# Patient Record
Sex: FEMALE | Race: BLACK OR AFRICAN AMERICAN | NOT HISPANIC OR LATINO
[De-identification: names, ages, dates, MRNs, and addresses within clinical notes are randomized per-mention and may not be internally consistent; named-entity substitution may affect disease eponyms.]

---

## 2021-06-12 ENCOUNTER — TRANSCRIPTION ENCOUNTER (OUTPATIENT)
Age: 76
End: 2021-06-12

## 2021-09-02 ENCOUNTER — OUTPATIENT (OUTPATIENT)
Dept: OUTPATIENT SERVICES | Facility: HOSPITAL | Age: 76
LOS: 1 days | Discharge: STILL ADMITTED OR ADMINISTRATIVE | End: 2021-09-02
Payer: MEDICARE

## 2021-09-02 PROCEDURE — 99215 OFFICE O/P EST HI 40 MIN: CPT | Mod: 95

## 2021-09-03 DIAGNOSIS — F03.91 UNSPECIFIED DEMENTIA WITH BEHAVIORAL DISTURBANCE: ICD-10-CM

## 2021-09-03 DIAGNOSIS — R41.9 UNSPECIFIED SYMPTOMS AND SIGNS INVOLVING COGNITIVE FUNCTIONS AND AWARENESS: ICD-10-CM

## 2021-10-12 ENCOUNTER — OUTPATIENT (OUTPATIENT)
Dept: OUTPATIENT SERVICES | Facility: HOSPITAL | Age: 76
LOS: 1 days | Discharge: HOME | End: 2021-10-12
Payer: MEDICARE

## 2021-10-12 PROCEDURE — 90792 PSYCH DIAG EVAL W/MED SRVCS: CPT | Mod: 95

## 2021-10-13 DIAGNOSIS — E78.5 HYPERLIPIDEMIA, UNSPECIFIED: ICD-10-CM

## 2021-10-13 DIAGNOSIS — F01.51 VASCULAR DEMENTIA, UNSPECIFIED SEVERITY, WITH BEHAVIORAL DISTURBANCE: ICD-10-CM

## 2021-10-13 DIAGNOSIS — I10 ESSENTIAL (PRIMARY) HYPERTENSION: ICD-10-CM

## 2021-10-13 DIAGNOSIS — E11.9 TYPE 2 DIABETES MELLITUS WITHOUT COMPLICATIONS: ICD-10-CM

## 2021-10-13 DIAGNOSIS — F06.4 ANXIETY DISORDER DUE TO KNOWN PHYSIOLOGICAL CONDITION: ICD-10-CM

## 2021-10-28 PROCEDURE — 99214 OFFICE O/P EST MOD 30 MIN: CPT | Mod: 95

## 2022-01-11 PROCEDURE — 99214 OFFICE O/P EST MOD 30 MIN: CPT | Mod: 95

## 2022-02-17 PROCEDURE — 99214 OFFICE O/P EST MOD 30 MIN: CPT | Mod: 95

## 2022-03-17 PROCEDURE — 99214 OFFICE O/P EST MOD 30 MIN: CPT | Mod: 95

## 2022-04-14 PROCEDURE — 99214 OFFICE O/P EST MOD 30 MIN: CPT | Mod: 95

## 2022-05-26 PROCEDURE — 99214 OFFICE O/P EST MOD 30 MIN: CPT | Mod: 95

## 2022-06-23 PROCEDURE — 99214 OFFICE O/P EST MOD 30 MIN: CPT | Mod: 95

## 2022-08-04 PROCEDURE — 99214 OFFICE O/P EST MOD 30 MIN: CPT | Mod: 95

## 2022-11-10 PROCEDURE — 99214 OFFICE O/P EST MOD 30 MIN: CPT | Mod: 95

## 2022-12-14 ENCOUNTER — APPOINTMENT (OUTPATIENT)
Dept: NEUROLOGY | Facility: CLINIC | Age: 77
End: 2022-12-14

## 2022-12-14 VITALS
SYSTOLIC BLOOD PRESSURE: 174 MMHG | TEMPERATURE: 98.4 F | BODY MASS INDEX: 20 KG/M2 | WEIGHT: 132 LBS | DIASTOLIC BLOOD PRESSURE: 78 MMHG | HEIGHT: 68 IN | HEART RATE: 62 BPM | OXYGEN SATURATION: 97 %

## 2022-12-14 PROCEDURE — 99205 OFFICE O/P NEW HI 60 MIN: CPT

## 2022-12-14 RX ORDER — DENOSUMAB 60 MG/ML
60 INJECTION SUBCUTANEOUS
Refills: 0 | Status: ACTIVE | COMMUNITY

## 2022-12-14 NOTE — PHYSICAL EXAM
[FreeTextEntry1] : PHYSICAL EXAM\par Constitutional: Alert, no acute distress \par Neck: Full range of motion\par Psychiatric: appropriate affect and mood\par Pulmonary: No respiratory distress, stable on room air\par \par NEUROLOGICAL EXAM\par Mental status: (MOCA detailed below). Oriented name and . Knows she lives in [facility], could not give me home address. Oriented to country ("[place] could not tell me name of state. Slow to respond and pauses multiple times. \par Speech/language: No dysarthria. Could name "pen" and "phone", calls watch a "clock on hand". Some comprehension difficulties and trouble following some simple tasks. \par Cranial nerves:\par CN II: Visual fields are full to confrontation. Pupil size equal and briskly reactive to light. \par CN III, IV, VI: EOMI, no nystagmus, no ptosis. Saccades and pursuits intact\par CN V: Facial sensation is intact to pinprick in all 3 divisions bilaterally.\par CN VII: Face is symmetric with normal eye closure and smile.\par CN VII: Hearing is normal to rubbing fingers\par CN IX, X: Palate elevates symmetrically. Phonation is normal.\par CN XI: Head turning and shoulder shrug are intact\par CN XII: Tongue is midline with normal movements and no atrophy.\par Motor: There is no pronator drift of out-stretched arms. Muscle bulk and tone are normal. b/l cogwheel rigidity at wrist with distraction. Strength is full bilaterally. 5/5 muscle power at bilat: Deltoid, Biceps, Triceps, Wrist ext, Finger abd, Hip flex, Hip ext, Knee flex, Knee ext, Ankle flex, Ankle ext\par Reflexes: Reflexes are 2+ and symmetric at the biceps and knees. 1+ ankle reflex. Plantar responses are flexor.\par Sensory: Intact sensations to light touch in upper and lower extremities\par Coordination: No tremors. No bradykinesia.\par Gait/Stance: Slightly stooped posture. Slow gait with short steps. Imbalance when making turns. Normal arm swings, holds arms out in an awkward position/stiff.\par \par Some ideomotor apraxia\par \par MOCA :\par Total      3/30\par Visuospatial/executive-  \par Naming- 1/3 \par Memory/delayed recall- immediate recall 3/5, delayed recall 0 (MIS: 0/15)\par Attention- \par Abstraction- 0\par Language- 0/3 \par Orientation-  (+ doctors office) (could say days of the week, but not the months)\par

## 2022-12-14 NOTE — HISTORY OF PRESENT ILLNESS
[FreeTextEntry1] : HPI (initial visit Dec 14, 2022)- WILSON SWANSON is a 77 year old right handed woman w/ hx of HTN, HLD, DM, Anxiety/depression, osteoporosis referred to neurology for memory loss. \par \par She is accompanied by daughter, Ciarra (). \par \par Memory loss- Cognitive decline most noticeable around the time of the pandemic (2020). Daughter was spending more time with her at home and noticing memory loss. Things were misplaced in the house, there would be toilet paper in the room. + Word finding difficulty, would get frustrated. SHe was more emotional labile, would cry. Got lost while driving a few times. She retired from her job as a . Daughter found multiple receipts and mail from years ago and money laying around in different envelopes. She walked out of the house a few times and would get lost and could not find her way back home. She followed up with PCP who recommended seeing neurologist (Dr. Ulisses Webb). [facility] brain [facility], carotid Doppler and EEG. Told she had "normal aging". No seizures on EEG. Daughter found a neuropsychologist and had a formal cognitive evaluation and was eventually diagnosed with vascular dementia. \par \par SHe is no longer driving. She lives with daughter. She goes to dementia center 3x/week in [place]. She rarely cooks. She talks to mirrors. Possibly has some visual hallucinations. SHe wanders in the middle of the night. No hx of strokes. No hx of seizures. No tremors. some gait imbalance. Daughter manages her medications. She feeds herself. Daughter helps her with dressing. \par \par She sees a kevin psychiatrist with [facility]. \par \par She was born and raised in [place]. \par Father probably had dementia. Her older brother likely has dementia as well.

## 2022-12-14 NOTE — DATA REVIEWED
[de-identified] : [facility] mild chronic small vessel ischemic changes.  diffuse cerebral atrophy. Ventriculomegaly noted, likely proportionate to degree of atrophy.\par [facility] Slight increase in periventricular white matter signal abnormality (microvascular ischemic vs transependymal flow) with possibly some slight increase in size of ventricles with more thinning of corpus callosum noted on this scan. Read as "moderate" microvascular ischemic changes with progression from last MRI. \par \par \par  [de-identified] : routine EEG 1/2020 normal

## 2022-12-14 NOTE — ASSESSMENT
[FreeTextEntry1] : Assessment/Plan:\par  77 year old female w/ hx of progressive cognitive and behavioral symptoms since approx 2020, affecting memory, executive functioning and language. She has had more emotional lability, hoarding behaviors and possibly some visual hallucinations as well. Furthermore, she has also had a decline in gait with more imbalance. On cognitive screening evaluation (MOCA) she performed very poorly and showed severe global cognitive impairment (3/30, mis 0/15). Neurological exam non focal and without clear parkinsonism. \par \par Moderate-severe dementia with behavioral disturbances- I suspect she may have likely had some mild cognitive symptoms even prior to 2020 which may have gone unnoticed with daughter living a very busy lifestyle. Given global cognitive impairment, it is hard to differentiate between different forms of dementia and would be important to differentiate between AD and FTD, in order to guide in treatment planning and counselling. Furthermore, there is at least mild-mod vascular changes on her brain MRI, which could explain a vascular dementia, however I would not expect her the degree of vascular changes on her MRI to explain her severe global cognitive impairment. Additionally, given the presence of balance problems with some ventriculomegaly on MRI, cannot completely rule out NPH. \par \par Plan:-\par [] Will order FDG PET scan to differentiate between possible AD vs FTD\par [] Daughter will email us all prior medical records, including neuropsych testing from 2021\par [] Discussed compensatory cognitive strategies\par [] Encouraged physical exercise, healthy eating, cognitive stimulation (readings books, word puzzles), social interaction and adequate hydration and sleep. \par [] Will refer to [facility]s Alzheimer and Dementia [facility] (ADC) program \par [] Counselled on treatment of vascular risk factors, sleep and mood disorders\par \par Return to clinic 3 months, or sooner if needed.\par If PET scan negative fir neurodegenerative disorder, would refer to memory disorder specialist for second opinion. \par \par Counselled on the diagnosis of Dementia, and reviewed its natural history. As the disease progresses, there is increasing impairment in cognitive functioning, including memory, language, and executive functioning. Behavioral changes also progress as the disease advances, as does one’s ability to function independently; eventually requiring full time assistance with daily needs. Counselled on safety and future planning. There is no curative treatment, however there are therapies available that can help temporarily mitigate cognitive symptoms of AD, but do not halt the overall progression of disease. Discussed strategies for maintaining cognitive health (encouraged healthy eating habits, routine physical exercise, social interaction and cognitive stimulations (reading, word puzzles)). Counselled on management of vascular risk factors. \par \par \inocencio Merrill M.D\par

## 2022-12-22 PROCEDURE — 99214 OFFICE O/P EST MOD 30 MIN: CPT | Mod: 95

## 2023-01-08 ENCOUNTER — APPOINTMENT (OUTPATIENT)
Dept: NUCLEAR MEDICINE | Facility: IMAGING CENTER | Age: 78
End: 2023-01-08
Payer: MEDICARE

## 2023-01-08 ENCOUNTER — OUTPATIENT (OUTPATIENT)
Dept: OUTPATIENT SERVICES | Facility: HOSPITAL | Age: 78
LOS: 1 days | End: 2023-01-08
Payer: MEDICARE

## 2023-01-08 DIAGNOSIS — F03.90 UNSPECIFIED DEMENTIA, UNSPECIFIED SEVERITY, WITHOUT BEHAVIORAL DISTURBANCE, PSYCHOTIC DISTURBANCE, MOOD DISTURBANCE, AND ANXIETY: ICD-10-CM

## 2023-01-08 PROCEDURE — 78608 BRAIN IMAGING (PET): CPT | Mod: 26

## 2023-01-08 PROCEDURE — A9552: CPT

## 2023-01-08 PROCEDURE — 78608 BRAIN IMAGING (PET): CPT

## 2023-01-10 ENCOUNTER — APPOINTMENT (OUTPATIENT)
Dept: GERIATRICS | Facility: CLINIC | Age: 78
End: 2023-01-10

## 2023-02-14 PROCEDURE — 99214 OFFICE O/P EST MOD 30 MIN: CPT | Mod: 95

## 2023-03-07 ENCOUNTER — APPOINTMENT (OUTPATIENT)
Dept: GERIATRICS | Facility: CLINIC | Age: 78
End: 2023-03-07
Payer: MEDICARE

## 2023-03-07 VITALS
HEIGHT: 68 IN | TEMPERATURE: 97.5 F | SYSTOLIC BLOOD PRESSURE: 175 MMHG | RESPIRATION RATE: 16 BRPM | OXYGEN SATURATION: 97 % | WEIGHT: 138.25 LBS | HEART RATE: 59 BPM | DIASTOLIC BLOOD PRESSURE: 82 MMHG | BODY MASS INDEX: 20.95 KG/M2

## 2023-03-07 DIAGNOSIS — Z91.83 UNSPECIFIED DEMENTIA WITH BEHAVIORAL DISTURBANCE: ICD-10-CM

## 2023-03-07 DIAGNOSIS — Z87.898 PERSONAL HISTORY OF OTHER SPECIFIED CONDITIONS: ICD-10-CM

## 2023-03-07 DIAGNOSIS — Z74.1 NEED FOR ASSISTANCE WITH PERSONAL CARE: ICD-10-CM

## 2023-03-07 DIAGNOSIS — F03.91 UNSPECIFIED DEMENTIA WITH BEHAVIORAL DISTURBANCE: ICD-10-CM

## 2023-03-07 DIAGNOSIS — Z91.81 HISTORY OF FALLING: ICD-10-CM

## 2023-03-07 PROCEDURE — 99205 OFFICE O/P NEW HI 60 MIN: CPT | Mod: 25

## 2023-03-07 RX ORDER — METFORMIN HYDROCHLORIDE 500 MG/1
500 TABLET, COATED ORAL DAILY
Refills: 0 | Status: ACTIVE | COMMUNITY

## 2023-03-07 RX ORDER — SERTRALINE HYDROCHLORIDE 50 MG/1
50 TABLET, FILM COATED ORAL DAILY
Refills: 0 | Status: ACTIVE | COMMUNITY

## 2023-03-07 NOTE — SOCIAL HISTORY
[With family] : lives with family [FreeTextEntry1] : Dyana bacon [FreeTextEntry3] : Born and raised in [place].  Goes to [facility] day program 3x/wk

## 2023-03-07 NOTE — PHYSICAL EXAM
[Sclera] : the sclera and conjunctiva were normal [Normal Outer Ear/Nose] : the ears and nose were normal in appearance [Normal] : no spinal tenderness [Normal Insight/Judgment] : insight and judgment were not intact [de-identified] : confused

## 2023-03-07 NOTE — ASSESSMENT
[FreeTextEntry1] : Main concerns are wandering at night and UI\par \par Available medical records reviewed \par Dtr to request med records from [facility]/cards - last 2 visit notes, labs, recent EKG, immunizations\par \par - Medications reviewed with CG and reconciled\par - Adv regularly stimulating activity - including cognitive and physical, and regular socialization\par - Adv 24/7 supervision for safety and assistance w/ ADLs\par - ADC program discussed and reading material about program provided. f/u with [facility] as soon as able advised \par - Referred to  for additional counseling, education, support, resources\par \par Timed voiding\par \par f/u for further cog/mood/behavior eval ASAP\par \par BP generally controlled per dtr - high today possibly d/t anxiety, monitor for now\par \par f/u with psych Dr. Marlow\par \par Rest as per PMD

## 2023-03-07 NOTE — HISTORY OF PRESENT ILLNESS
[] : Assistance needed with traveling/transport [Full assistance needed] : Assistance needed managing medications [1] : 2) Feeling down, depressed, or hopeless for several days (1) [PHQ-2 Positive] : PHQ-2 Positive [I have developed a follow-up plan documented below in the note.] : I have developed a follow-up plan documented below in the note. [FreeTextEntry1] : Arrives late to initial visit today.  Dyana states difficult to get patient out of bed and out of the house sometimes. \par \par Patient denies having any complaints however limited historian d/t baseline confusion. \par \par COGNITIVE CHANGES / [x ] MEMORY LOSS\par - Prior to pandemic dtr noted mail piling up and was reporting memory loss.  Got lost driving few times prior to pandemic. Wandering around got lost - neighbors reported to daughter that pt was lost in the neighborhood. Was having symptoms in 2019 - approx 1 year prior to prison in 2/2020. \par \par Per neuro: Cognitive decline most noticeable around the time of the pandemic (2020). Daughter was spending more time with her at home and noticing memory loss. Things were misplaced in the house, there would be toilet paper in the room. + Word finding difficulty, would get frustrated. SHe was more emotional labile, would cry. Got lost while driving a few times. She retired from her job as a . Daughter found multiple receipts and mail from years ago and money laying around in different envelopes. She walked out of the house a few times and would get lost and could not find her way back home. She followed up with PCP who recommended seeing neurologist (Dr. Ulisses Webb). She saw a neurologist at [facility]. She had MRI brain scans, carotid Doppler and EEG. Told she had "normal aging". No seizures on EEG. Daughter found a neuropsychologist and had a formal cognitive evaluation and was eventually diagnosed with vascular dementia. \par \par - Motor Syx: Ambulates independently. Dizzy sometimes in the morning. \par Fell in the garden in 7/2021. \par \par - Sleep: Goes to bed approx 10-11pm, wakes up at 4am\par \par - Appetite: was loosing wt in past 5 yrs, now wt is stable \par \par - Mood/behavior: She talks to mirrors. Possibly has some visual hallucinations. SHe wanders in the middle of the night. \par \par - MoCA  3/30 w/ neuro Dr. Merrill in 12/22. \par - MOCA: \par - LABS: no recent labs available \par - Per neuro: \par MRI brain 8/2019 @ [facility] radiology:- mild chronic small vessel ischemic changes. diffuse cerebral atrophy. Ventriculomegaly noted, likely proportionate to degree of atrophy.\par [facility] brain 9/2021 @ [facility]- Slight increase in periventricular white matter signal abnormality (microvascular ischemic vs transependymal flow) with possibly some slight increase in size of ventricles with more thinning of corpus callosum noted on this scan. Read as "moderate" microvascular ischemic changes with progression from last MRI. \par - PET FDG Brain 1/10/23:  c/w underlying advanced neurodegenerative disease, pattern most s/o AD. \par \par [ x] Neuro Dr. Merrill - seen in 12/22 - imp: dementia w/o BD, mod-severe - discussed trial Namenda\par [x ] Psych Dr. Guevara \par \par MURMUR \par - Planned for Stress test \par - Follows w/ cards\par \par T2DM / HTN / HLD \par - BP generally controlled per dtr \par \par PMD: Dr. Hargrove - last seen in 2/23 including for BW\par  [FreeTextEntry2] : dtr helps [FreeTextEntry6] : no longer driving  [FreeTextEntry7] : azeem wade  [GCT2Vpmiw] : 2

## 2023-03-07 NOTE — REASON FOR VISIT
[Initial Evaluation] : an initial evaluation [FreeTextEntry1] : dementia, ADCp [FreeTextEntry3] : azeem Lafleur  () [FreeTextEntry2] : friend Dyana, who assists with history due to patient with baseline cognitive impairment

## 2023-03-23 ENCOUNTER — APPOINTMENT (OUTPATIENT)
Dept: GERIATRICS | Facility: CLINIC | Age: 78
End: 2023-03-23
Payer: MEDICARE

## 2023-03-23 ENCOUNTER — NON-APPOINTMENT (OUTPATIENT)
Age: 78
End: 2023-03-23

## 2023-03-23 VITALS — DIASTOLIC BLOOD PRESSURE: 64 MMHG | SYSTOLIC BLOOD PRESSURE: 152 MMHG

## 2023-03-23 VITALS
HEART RATE: 55 BPM | DIASTOLIC BLOOD PRESSURE: 72 MMHG | WEIGHT: 136 LBS | BODY MASS INDEX: 20.68 KG/M2 | TEMPERATURE: 97.9 F | OXYGEN SATURATION: 98 % | SYSTOLIC BLOOD PRESSURE: 154 MMHG | RESPIRATION RATE: 15 BRPM

## 2023-03-23 DIAGNOSIS — I10 ESSENTIAL (PRIMARY) HYPERTENSION: ICD-10-CM

## 2023-03-23 DIAGNOSIS — F41.9 ANXIETY DISORDER, UNSPECIFIED: ICD-10-CM

## 2023-03-23 DIAGNOSIS — R73.01 IMPAIRED FASTING GLUCOSE: ICD-10-CM

## 2023-03-23 DIAGNOSIS — Z13.820 ENCOUNTER FOR SCREENING FOR OSTEOPOROSIS: ICD-10-CM

## 2023-03-23 DIAGNOSIS — Z63.6 DEPENDENT RELATIVE NEEDING CARE AT HOME: ICD-10-CM

## 2023-03-23 DIAGNOSIS — Z13.21 ENCOUNTER FOR SCREENING FOR NUTRITIONAL DISORDER: ICD-10-CM

## 2023-03-23 DIAGNOSIS — Z13.31 ENCOUNTER FOR SCREENING FOR DEPRESSION: ICD-10-CM

## 2023-03-23 DIAGNOSIS — Z71.89 OTHER SPECIFIED COUNSELING: ICD-10-CM

## 2023-03-23 DIAGNOSIS — Z12.83 ENCOUNTER FOR SCREENING FOR MALIGNANT NEOPLASM OF SKIN: ICD-10-CM

## 2023-03-23 DIAGNOSIS — Z13.29 ENCOUNTER FOR SCREENING FOR OTHER SUSPECTED ENDOCRINE DISORDER: ICD-10-CM

## 2023-03-23 DIAGNOSIS — Z11.59 ENCOUNTER FOR SCREENING FOR OTHER VIRAL DISEASES: ICD-10-CM

## 2023-03-23 DIAGNOSIS — E78.5 HYPERLIPIDEMIA, UNSPECIFIED: ICD-10-CM

## 2023-03-23 DIAGNOSIS — E55.9 VITAMIN D DEFICIENCY, UNSPECIFIED: ICD-10-CM

## 2023-03-23 LAB
25(OH)D3 SERPL-MCNC: 48.8 NG/ML
ALBUMIN SERPL ELPH-MCNC: 4.3 G/DL
ALP BLD-CCNC: 55 U/L
ALT SERPL-CCNC: 18 U/L
ANION GAP SERPL CALC-SCNC: 14 MMOL/L
AST SERPL-CCNC: 28 U/L
BASOPHILS # BLD AUTO: 0.02 K/UL
BASOPHILS NFR BLD AUTO: 0.4 %
BILIRUB SERPL-MCNC: 0.4 MG/DL
BUN SERPL-MCNC: 13 MG/DL
CALCIUM SERPL-MCNC: 9.5 MG/DL
CHLORIDE SERPL-SCNC: 102 MMOL/L
CHOLEST SERPL-MCNC: 200 MG/DL
CO2 SERPL-SCNC: 24 MMOL/L
COVID-19 NUCLEOCAPSID  GAM ANTIBODY INTERPRETATION: POSITIVE
CREAT SERPL-MCNC: 0.54 MG/DL
EGFR: 95 ML/MIN/1.73M2
EOSINOPHIL # BLD AUTO: 0.03 K/UL
EOSINOPHIL NFR BLD AUTO: 0.6 %
FOLATE SERPL-MCNC: >20 NG/ML
GLUCOSE SERPL-MCNC: 73 MG/DL
HCT VFR BLD CALC: 38.7 %
HDLC SERPL-MCNC: 76 MG/DL
HGB BLD-MCNC: 12.5 G/DL
IMM GRANULOCYTES NFR BLD AUTO: 0.4 %
LDLC SERPL CALC-MCNC: 111 MG/DL
LYMPHOCYTES # BLD AUTO: 2.14 K/UL
LYMPHOCYTES NFR BLD AUTO: 44.4 %
MAN DIFF?: NORMAL
MCHC RBC-ENTMCNC: 30.5 PG
MCHC RBC-ENTMCNC: 32.3 GM/DL
MCV RBC AUTO: 94.4 FL
MONOCYTES # BLD AUTO: 0.48 K/UL
MONOCYTES NFR BLD AUTO: 10 %
NEUTROPHILS # BLD AUTO: 2.13 K/UL
NEUTROPHILS NFR BLD AUTO: 44.2 %
NONHDLC SERPL-MCNC: 124 MG/DL
PLATELET # BLD AUTO: 188 K/UL
POTASSIUM SERPL-SCNC: 4.6 MMOL/L
PROT SERPL-MCNC: 6.4 G/DL
RBC # BLD: 4.1 M/UL
RBC # FLD: 13.7 %
SARS-COV-2 AB SERPL QL IA: 162 INDEX
SODIUM SERPL-SCNC: 140 MMOL/L
TRIGL SERPL-MCNC: 66 MG/DL
TSH SERPL-ACNC: 1.01 UIU/ML
VIT B12 SERPL-MCNC: 1703 PG/ML
WBC # FLD AUTO: 4.82 K/UL

## 2023-03-23 PROCEDURE — 99483 ASSMT & CARE PLN PT COG IMP: CPT

## 2023-03-23 PROCEDURE — 93000 ELECTROCARDIOGRAM COMPLETE: CPT | Mod: 59

## 2023-03-23 RX ORDER — PANTOPRAZOLE SODIUM 40 MG/1
40 TABLET, DELAYED RELEASE ORAL
Refills: 0 | Status: DISCONTINUED | COMMUNITY
End: 2023-03-23

## 2023-03-23 SDOH — SOCIAL STABILITY - SOCIAL INSECURITY: DEPENDENT RELATIVE NEEDING CARE AT HOME: Z63.6

## 2023-03-24 LAB
ESTIMATED AVERAGE GLUCOSE: 120 MG/DL
HBA1C MFR BLD HPLC: 5.8 %

## 2023-03-30 LAB — VIT B1 SERPL-MCNC: 145.2 NMOL/L

## 2023-03-30 PROCEDURE — 99214 OFFICE O/P EST MOD 30 MIN: CPT | Mod: 95

## 2023-03-30 PROCEDURE — 99213 OFFICE O/P EST LOW 20 MIN: CPT | Mod: 95

## 2023-05-04 PROCEDURE — 99214 OFFICE O/P EST MOD 30 MIN: CPT | Mod: 95

## 2023-05-25 ENCOUNTER — TRANSCRIPTION ENCOUNTER (OUTPATIENT)
Age: 78
End: 2023-05-25

## 2023-06-08 PROCEDURE — 99214 OFFICE O/P EST MOD 30 MIN: CPT | Mod: 95

## 2023-09-18 ENCOUNTER — APPOINTMENT (OUTPATIENT)
Dept: NEUROLOGY | Facility: CLINIC | Age: 78
End: 2023-09-18
Payer: MEDICARE

## 2023-09-18 VITALS
DIASTOLIC BLOOD PRESSURE: 74 MMHG | HEIGHT: 68 IN | SYSTOLIC BLOOD PRESSURE: 174 MMHG | BODY MASS INDEX: 19.4 KG/M2 | HEART RATE: 60 BPM | WEIGHT: 128 LBS

## 2023-09-18 PROCEDURE — 99214 OFFICE O/P EST MOD 30 MIN: CPT

## 2023-09-18 RX ORDER — DONEPEZIL HYDROCHLORIDE 10 MG/1
10 TABLET ORAL
Qty: 30 | Refills: 0 | Status: ACTIVE | COMMUNITY
Start: 2023-08-25

## 2023-10-15 ENCOUNTER — NON-APPOINTMENT (OUTPATIENT)
Age: 78
End: 2023-10-15

## 2023-10-19 ENCOUNTER — APPOINTMENT (OUTPATIENT)
Dept: NEUROLOGY | Facility: CLINIC | Age: 78
End: 2023-10-19
Payer: MEDICARE

## 2023-10-19 ENCOUNTER — NON-APPOINTMENT (OUTPATIENT)
Age: 78
End: 2023-10-19

## 2023-10-19 PROCEDURE — 95816 EEG AWAKE AND DROWSY: CPT

## 2023-10-20 PROCEDURE — 95717 EEG PHYS/QHP 2-12 HR W/O VID: CPT

## 2023-10-20 PROCEDURE — 95719 EEG PHYS/QHP EA INCR W/O VID: CPT

## 2023-10-20 PROCEDURE — 95705 EEG W/O VID 2-12 HR UNMNTR: CPT

## 2023-10-20 PROCEDURE — 95708 EEG WO VID EA 12-26HR UNMNTR: CPT

## 2023-10-25 ENCOUNTER — NON-APPOINTMENT (OUTPATIENT)
Age: 78
End: 2023-10-25

## 2023-10-26 ENCOUNTER — NON-APPOINTMENT (OUTPATIENT)
Age: 78
End: 2023-10-26

## 2023-10-30 ENCOUNTER — APPOINTMENT (OUTPATIENT)
Dept: NEUROLOGY | Facility: CLINIC | Age: 78
End: 2023-10-30
Payer: MEDICARE

## 2023-10-30 VITALS — HEART RATE: 55 BPM | SYSTOLIC BLOOD PRESSURE: 195 MMHG | DIASTOLIC BLOOD PRESSURE: 67 MMHG

## 2023-10-30 PROCEDURE — 99214 OFFICE O/P EST MOD 30 MIN: CPT

## 2023-11-02 ENCOUNTER — TRANSCRIPTION ENCOUNTER (OUTPATIENT)
Age: 78
End: 2023-11-02

## 2023-12-12 PROCEDURE — 99214 OFFICE O/P EST MOD 30 MIN: CPT

## 2024-01-18 ENCOUNTER — APPOINTMENT (OUTPATIENT)
Dept: NEUROLOGY | Facility: CLINIC | Age: 79
End: 2024-01-18
Payer: MEDICARE

## 2024-01-18 VITALS
DIASTOLIC BLOOD PRESSURE: 76 MMHG | HEART RATE: 77 BPM | BODY MASS INDEX: 21.73 KG/M2 | WEIGHT: 132 LBS | SYSTOLIC BLOOD PRESSURE: 145 MMHG | HEIGHT: 65.5 IN

## 2024-01-18 PROCEDURE — 99214 OFFICE O/P EST MOD 30 MIN: CPT

## 2024-01-18 PROCEDURE — 99215 OFFICE O/P EST HI 40 MIN: CPT

## 2024-01-18 RX ORDER — DENOSUMAB 60 MG/ML
INJECTION SUBCUTANEOUS
Refills: 0 | Status: ACTIVE | COMMUNITY

## 2024-01-18 RX ORDER — LOSARTAN POTASSIUM 25 MG/1
25 TABLET, FILM COATED ORAL
Refills: 0 | Status: DISCONTINUED | COMMUNITY
End: 2024-01-18

## 2024-01-18 RX ORDER — LOSARTAN POTASSIUM 100 MG/1
100 TABLET, FILM COATED ORAL
Refills: 0 | Status: ACTIVE | COMMUNITY

## 2024-01-18 RX ORDER — MEMANTINE HYDROCHLORIDE 5 MG/1
5 TABLET, FILM COATED ORAL TWICE DAILY
Refills: 0 | Status: ACTIVE | COMMUNITY

## 2024-01-18 RX ORDER — BACILLUS COAGULANS/INULIN 1B-250 MG
CAPSULE ORAL
Refills: 0 | Status: ACTIVE | COMMUNITY

## 2024-01-18 RX ORDER — AMLODIPINE BESYLATE 10 MG/1
10 TABLET ORAL
Refills: 0 | Status: ACTIVE | COMMUNITY

## 2024-01-18 RX ORDER — MULTIVIT-MIN/FA/LYCOPEN/LUTEIN .4-300-25
TABLET ORAL
Refills: 0 | Status: ACTIVE | COMMUNITY

## 2024-01-18 NOTE — HISTORY OF PRESENT ILLNESS
[FreeTextEntry1] : INTERIM HX 01/18/2024: She was admitted to [facility] earlier this month for COVID and UTI. Daughter reports Behavorial symptoms worsened just before admission, pt became more aggressive, confused and was walking around naked and wanted to leave the house. She had a seizure at home, had generalized convulsions x 2. In hospital, she had 72 hr EEG which showed some seizure activity per daughter and started her on valproic acid 250 mg BID. She was treated for UTI. since discharge from hospital, she is "night and day different".  Pt followed by [facility] pink. Started on memantine 5 mg BID (still on titration) last month.  She has 24 hr home health aid.   INTERIM HX 10/30/2023: Pt had a seizure 2 weekends ago, witnessed- choking noises > generalized shaking and eyes rolled up. Lasted ~ 10 sec. Taken to [facility]. Reported to have low BS and low BP. Verapamil and metformin dc'd. Leander @ hospital neg for seizures per daughter. no AED started, advised to follow up outpatient.  pt had 24 hr vEEG 10/19, 30 min report with mild diffuse slowing, no seizures, final report pending.    INTERIM HX 09/15/2023: FDG PET scan 1/10/2023- c/w AD. Started seeing ADC program 3/2023. Returns for follow up w. concerns for passing out spells. Here with daughter. In the last year, daughter reports patient has had 6 pass out spells, she had been seen at [facility] for these spells and diagnosed with either "vasovagal" or hypoglycemia. Daughter reports that she usually hears a loud noise and then finds her mother on the floor, she is confused/incoherent for some time after episode, has had bowel incontinence on 1-2 occasions. No witnessed seizure like activity.  From cognitive standpoint, pt is stable, able to hold a conversation. She does wander.  PCP started on her donepezil 2 weeks ago. Able to bath and feed herself. SHe has some trouble comprehending/executing instructions. Repeats herself frequently. Followed by [facility], Dr Braxton.   HPI (initial visit Dec 14, 2022)- WILSON SWANSON is a 77 year old right handed woman w/ hx of HTN, HLD, DM, Anxiety/depression, osteoporosis referred to neurology for memory loss.   She is accompanied by daughter, Ciarra ().   Memory loss- Cognitive decline most noticeable around the time of the pandemic (2020). Daughter was spending more time with her at home and noticing memory loss. Things were misplaced in the house, there would be toilet paper in the room. + Word finding difficulty, would get frustrated. SHe was more emotional labile, would cry. Got lost while driving a few times. She retired from her job as a . Daughter found multiple receipts and mail from years ago and money laying around in different envelopes. She walked out of the house a few times and would get lost and could not find her way back home. She followed up with PCP who recommended seeing neurologist (Dr. Ulisses Webb). She saw a neurologist at [facility]. She had MRI brain scans, carotid Doppler and EEG. Told she had "normal aging". No seizures on EEG. Daughter found a neuropsychologist and had a formal cognitive evaluation and was eventually diagnosed with vascular dementia.   SHe is no longer driving. She lives with daughter. She goes to dementia center 3x/week in [place]. She rarely cooks. She talks to mirrors. Possibly has some visual hallucinations. SHe wanders in the middle of the night. No hx of strokes. No hx of seizures. No tremors. some gait imbalance. Daughter manages her medications. She feeds herself. Daughter helps her with dressing.   She sees a kevin psychiatrist with [facility].   She was born and raised in [place].  Father probably had dementia. Her older brother likely has dementia as well.

## 2024-01-18 NOTE — ASSESSMENT
[FreeTextEntry1] : Assessment/Plan:  78 year old female w/ hx of progressive cognitive and behavioral symptoms since approx 2020, affecting memory, executive functioning and language. She has had more emotional lability, hoarding behaviors and possibly some visual hallucinations as well. Furthermore, she has also had a decline in gait with more imbalance. On cognitive screening evaluation (MOCA) 12/2022 she performed very poorly and showed severe global cognitive impairment (3/30, mis 0/15). Neurological exam non focal and without clear parkinsonism.  Moderate-severe dementia with behavioral disturbances- Global cognitive impairment. Clinically I suspected neurodegenerative dementia, AD vs FTD. FDG PET scan c/w AD.  Imp: # AD +/- vascular dementia (Mixed dementia)- moderate stage # Seizure disorder since 2023     Plan:- [] Daughter to obtain records from [facility] [] Continue valproic acid 250 mg BID for seizure prophylaxis.  [] Continue donepezil 10 mg QD (continue to monitor HR).  [] Continue memantine 5 mg BID. Can increase to 10 mg BID, as tolerated. [] Discussed compensatory cognitive strategies [] Encouraged physical exercise, healthy eating, cognitive stimulation (readings books, word puzzles), social interaction and adequate hydration and sleep. [] [facility] (filled out paperwork at visit and handed back to daughter) [] Counselled on treatment of vascular risk factors, sleep and mood disorders. [] Continue to follow with [facility] psych (Dr Braxton)- will contact her with updates.    Counselled on the diagnosis of AD, and reviewed its natural history. As the disease progresses, there is increasing impairment in cognitive functioning, including memory, language, and executive functioning. Behavioral changes also progress as the disease advances, as does one's ability to function independently; eventually requiring full time assistance with daily needs. Counselled on safety and future planning. There is no curative treatment, however there are therapies available that can help temporarily mitigate cognitive symptoms of AD, but do not halt the overall progression of disease. Discussed strategies for maintaining cognitive health (encouraged healthy eating habits, routine physical exercise, social interaction and cognitive stimulations (reading, word puzzles)). Counselled on management of vascular risk factors.  RTC 4 months  Leticia Merrill M.D.

## 2024-01-18 NOTE — PHYSICAL EXAM
[FreeTextEntry1] :  MOCA 2022: 3/30   Alert, no acute distress, attentive, calm. Touches herself inappropriately during visit.  Oriented name and . Knows I am a "doctor". No dysarthria.  Able to name objects. Some comprehension difficulties and trouble following some simple tasks. EOMI, no nystagmus, no ptosis Face is symmetric with normal eye closure and smile. There is drift in UE or LE, moving all extremities symmetrically.  b/l cogwheel rigidity at wrist with distraction. No tremors. No bradykinesia. Slightly stooped posture. Slow gait with short steps. Imbalance when making turns.

## 2024-01-18 NOTE — DATA REVIEWED
[de-identified] : [facility] vessel ischemic changes.  diffuse cerebral atrophy. Ventriculomegaly noted, likely proportionate to degree of atrophy.\par  [facility] Slight increase in periventricular white matter signal abnormality (microvascular ischemic vs transependymal flow) with possibly some slight increase in size of ventricles with more thinning of corpus callosum noted on this scan. Read as "moderate" microvascular ischemic changes with progression from last MRI. \par  \par  \par   [de-identified] : routine EEG 1/2020 normal [de-identified] :  FDG PET scan 1/10/2023- c/w AD

## 2024-01-23 ENCOUNTER — APPOINTMENT (OUTPATIENT)
Dept: NEUROLOGY | Facility: CLINIC | Age: 79
End: 2024-01-23

## 2024-02-08 ENCOUNTER — APPOINTMENT (OUTPATIENT)
Dept: UROLOGY | Facility: IMAGING CENTER | Age: 79
End: 2024-02-08
Payer: MEDICARE

## 2024-02-08 VITALS
DIASTOLIC BLOOD PRESSURE: 28 MMHG | WEIGHT: 135 LBS | SYSTOLIC BLOOD PRESSURE: 159 MMHG | HEIGHT: 65 IN | BODY MASS INDEX: 22.49 KG/M2 | RESPIRATION RATE: 17 BRPM | HEART RATE: 58 BPM

## 2024-02-08 DIAGNOSIS — R32 UNSPECIFIED URINARY INCONTINENCE: ICD-10-CM

## 2024-02-08 DIAGNOSIS — R30.0 DYSURIA: ICD-10-CM

## 2024-02-08 DIAGNOSIS — E11.9 TYPE 2 DIABETES MELLITUS W/OUT COMPLICATIONS: ICD-10-CM

## 2024-02-08 DIAGNOSIS — N39.0 URINARY TRACT INFECTION, SITE NOT SPECIFIED: ICD-10-CM

## 2024-02-08 DIAGNOSIS — Z63.5 DISRUPTION OF FAMILY BY SEPARATION AND DIVORCE: ICD-10-CM

## 2024-02-08 DIAGNOSIS — N95.2 POSTMENOPAUSAL ATROPHIC VAGINITIS: ICD-10-CM

## 2024-02-08 PROCEDURE — 51701 INSERT BLADDER CATHETER: CPT

## 2024-02-08 PROCEDURE — 99204 OFFICE O/P NEW MOD 45 MIN: CPT | Mod: 25

## 2024-02-08 RX ORDER — ESTRADIOL 0.1 MG/G
0.1 CREAM VAGINAL
Qty: 2 | Refills: 1 | Status: ACTIVE | COMMUNITY
Start: 2024-02-08 | End: 1900-01-01

## 2024-02-08 SDOH — SOCIAL STABILITY - SOCIAL INSECURITY: DISRUPTION OF FAMILY BY SEPARATION AND DIVORCE: Z63.5

## 2024-02-08 NOTE — REVIEW OF SYSTEMS
[Negative] : Heme/Lymph [Chills] : chills [Feeling Tired] : feeling tired [Dry Eyes] : dryness of the eyes [Palpitations] : palpitations [Abdominal Pain] : abdominal pain [Constipation] : constipation [Joint Pain] : joint pain [Dizziness] : dizziness

## 2024-02-09 LAB
APPEARANCE: CLEAR
BACTERIA: NEGATIVE /HPF
BILIRUBIN URINE: NEGATIVE
BLOOD URINE: NEGATIVE
CAST: 0 /LPF
COLOR: NORMAL
EPITHELIAL CELLS: 0 /HPF
GLUCOSE QUALITATIVE U: NEGATIVE MG/DL
KETONES URINE: NEGATIVE MG/DL
LEUKOCYTE ESTERASE URINE: NEGATIVE
MICROSCOPIC-UA: NORMAL
NITRITE URINE: NEGATIVE
PH URINE: 7.5
PROTEIN URINE: NEGATIVE MG/DL
RED BLOOD CELLS URINE: 1 /HPF
REVIEW: NORMAL
SPECIFIC GRAVITY URINE: 1.02
UROBILINOGEN URINE: 0.2 MG/DL
WHITE BLOOD CELLS URINE: 1 /HPF

## 2024-02-11 LAB — BACTERIA UR CULT: NORMAL

## 2024-02-12 NOTE — PHYSICAL EXAM
[Urinary Bladder Findings] : the bladder was normal on palpation [External Female Genitalia] : normal external genitalia [de-identified] : mild cystocele; small urethral caruncle; leakage with cough for small amount; no discharge, lesions or blood from vagina

## 2024-02-12 NOTE — LETTER BODY
[Dear  ___] : Dear  [unfilled], [Consult Letter:] : I had the pleasure of evaluating your patient, [unfilled]. [Please see my note below.] : Please see my note below. [Consult Closing:] : Thank you very much for allowing me to participate in the care of this patient.  If you have any questions, please do not hesitate to contact me. [FreeTextEntry1] : Please see my note. I will keep you informed. [FreeTextEntry3] : Sincerely,  Katherine Kan MD Clinical  [facility]

## 2024-02-12 NOTE — ASSESSMENT
[FreeTextEntry1] : cath'ed for 65 ml of concentrated urine after voiding 30 - 40 min prior.  Not retaining.  Will consider application of estradiol cream topically x 1 mo and then 2x'/wk  Urine will be sent  Needs to be up to date with mammo: told family to call GYN to see when last mammo was  Taught how to apply cream.  Explained how it works and why it is thought to help this issue. Also, UTI avoidance strategies reviewed.  spoke of data:  Hydrate to >2.7 L or more daily manage and maintain normal bowels Consider daily probiotic Spoke of what is known about Ellura  In post menopausal women, should strongly consider topical or intravaginal estrogen therapy, if safe.  They would like to start with this.  Also, she does not retain: had only 80 ml of clear urine in bladder> cath'ed cx was negative.

## 2024-02-12 NOTE — HISTORY OF PRESENT ILLNESS
[FreeTextEntry1] : WILSON SWANSON is a 78 year old F who presents with diabetes and very bad dementia.  Has depends and does get sensation to void and goes into restroom to void, and apparently goes into restroom 1-2x's/hr. No known h/o stones or Gross hematuria.  Reportedly, no vaginal bleeding and family friend says she might have seen Gyn a yr or so ago.  Occ fecal incontinence, but rare. Has also had blood in stool, but not urine.  no known h/o febrile UTI, but apparently was admitted to [facility] for UTI after almost passing out.  Patient keeps grabbing her private region saying it's itchy and burns.

## 2024-02-15 PROCEDURE — 99214 OFFICE O/P EST MOD 30 MIN: CPT

## 2024-02-21 RX ORDER — VALPROIC ACID 250 MG/1
250 CAPSULE, LIQUID FILLED ORAL TWICE DAILY
Qty: 60 | Refills: 6 | Status: ACTIVE | COMMUNITY
Start: 1900-01-01 | End: 1900-01-01

## 2024-02-29 ENCOUNTER — APPOINTMENT (OUTPATIENT)
Dept: NEUROLOGY | Facility: CLINIC | Age: 79
End: 2024-02-29
Payer: MEDICARE

## 2024-02-29 DIAGNOSIS — G40.909 EPILEPSY, UNSPECIFIED, NOT INTRACTABLE, W/OUT STATUS EPILEPTICUS: ICD-10-CM

## 2024-02-29 PROCEDURE — 99214 OFFICE O/P EST MOD 30 MIN: CPT

## 2024-02-29 NOTE — PHYSICAL EXAM
[FreeTextEntry1] : MOCA 2022: 3/30   Alert, no acute distress, attentive.  Oriented name and . Knows I am a "doctor". No dysarthria. Able to name objects. Some comprehension difficulties and trouble following some simple tasks. EOMI, no nystagmus, no ptosis Face is symmetric with normal eye closure and smile. There is drift in UE or LE, moving all extremities symmetrically. b/l cogwheel rigidity at wrist with distraction. No tremors. No bradykinesia. Slightly stooped posture. Slow gait with short steps. Imbalance when making turns.

## 2024-02-29 NOTE — ASSESSMENT
[FreeTextEntry1] : Assessment/Plan:  78 year old female w/ hx of progressive cognitive and behavioral symptoms since approx 2020, affecting memory, executive functioning and language. She has had more emotional lability, hoarding behaviors and possibly some visual hallucinations as well. Furthermore, she has also had a decline in gait with more imbalance. On cognitive screening evaluation (MOCA) 12/2022 she performed very poorly and showed severe global cognitive impairment (3/30, mis 0/15). Neurological exam non focal and without clear parkinsonism.  Moderate-severe dementia with behavioral disturbances- Global cognitive impairment. Clinically I suspected neurodegenerative dementia, AD vs FTD. FDG PET scan c/w AD.  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  [] Continue donepezil 10 mg QD (continue to monitor HR). [] Continue memantine 5 mg BID. Can increase to 10 mg BID, as tolerated. [] Discussed compensatory cognitive strategies [] Encouraged physical exercise, healthy eating, cognitive stimulation (readings books, word puzzles), social interaction and adequate hydration and sleep. [] [facility] (filled out paperwork at visit and handed back to daughter) [] Counselled on treatment of vascular risk factors, sleep and mood disorders. [] Continue to follow with kevin psych (Dr Coronado)   Counselled on the diagnosis of AD, and reviewed its natural history. As the disease progresses, there is increasing impairment in cognitive functioning, including memory, language, and executive functioning. Behavioral changes also progress as the disease advances, as does one's ability to function independently; eventually requiring full time assistance with daily needs. Counselled on safety and future planning. There is no curative treatment, however there are therapies available that can help temporarily mitigate cognitive symptoms of AD, but do not halt the overall progression of disease. Discussed strategies for maintaining cognitive health (encouraged healthy eating habits, routine physical exercise, social interaction and cognitive stimulations (reading, word puzzles)). Counselled on management of vascular risk factors.   Leticia Merrill M.D.

## 2024-02-29 NOTE — HISTORY OF PRESENT ILLNESS
[FreeTextEntry1] : INTERIM HX 02/29/2024: Here w/ HHA. Daughter (Ciarra) on the phone. More aggressiveness and some physical aggression. Daughter concerned she might have a UTI. No seizures since last visit.  PCP prescribed risperidone 0.5 mg BID (not started yet)- dtr will speak w. psych first  INTERIM HX 01/18/2024: She was admitted to [facility] earlier this month for COVID and UTI. Daughter reports Behavorial symptoms worsened just before admission, pt became more aggressive, confused and was walking around naked and wanted to leave the house. She had a seizure at home, had generalized convulsions x 2. In hospital, she had 72 hr EEG which showed some seizure activity per daughter and started her on valproic acid 250 mg BID. She was treated for UTI. since discharge from hospital, she is "night and day different".  Pt followed by kevin psych. Started on memantine 5 mg BID (still on titration) last month.  She has 24 hr home health aid.   INTERIM HX 10/30/2023: Pt had a seizure 2 weekends ago, witnessed- choking noises > generalized shaking and eyes rolled up. Lasted ~ 10 sec. Taken to [facility]. Reported to have low BS and low BP. Verapamil and metformin dc'd. [place] @ hospital neg for seizures per daughter. no AED started, advised to follow up outpatient.  pt had 24 hr vEEG 10/19, 30 min report with mild diffuse slowing, no seizures, final report pending.    INTERIM HX 09/15/2023: FDG PET scan 1/10/2023- c/w AD. Started seeing ADC program 3/2023. Returns for follow up w. concerns for passing out spells. Here with daughter. In the last year, daughter reports patient has had 6 pass out spells, she had been seen at [facility] for these spells and diagnosed with either "vasovagal" or hypoglycemia. Daughter reports that she usually hears a loud noise and then finds her mother on the floor, she is confused/incoherent for some time after episode, has had bowel incontinence on 1-2 occasions. No witnessed seizure like activity.  From cognitive standpoint, pt is stable, able to hold a conversation. She does wander.  PCP started on her donepezil 2 weeks ago. Able to bath and feed herself. SHe has some trouble comprehending/executing instructions. Repeats herself frequently. Followed by kevin psych, Dr Braxton.   HPI (initial visit Dec 14, 2022)- WILSON SWANSON is a 77 year old right handed woman w/ hx of HTN, HLD, DM, Anxiety/depression, osteoporosis referred to neurology for memory loss.   She is accompanied by daughter, Ciarra ().   Memory loss- Cognitive decline most noticeable around the time of the pandemic (2020). Daughter was spending more time with her at home and noticing memory loss. Things were misplaced in the house, there would be toilet paper in the room. + Word finding difficulty, would get frustrated. SHe was more emotional labile, would cry. Got lost while driving a few times. She retired from her job as a . Daughter found multiple receipts and mail from years ago and money laying around in different envelopes. She walked out of the house a few times and would get lost and could not find her way back home. She followed up with PCP who recommended seeing neurologist (Dr. Ulisses Webb). She saw a neurologist at [facility]. She had MRI brain scans, carotid Doppler and EEG. Told she had "normal aging". No seizures on EEG. Daughter found a neuropsychologist and had a formal cognitive evaluation and was eventually diagnosed with vascular dementia.   SHe is no longer driving. She lives with daughter. She goes to dementia center 3x/week in [place]. She rarely cooks. She talks to mirrors. Possibly has some visual hallucinations. SHe wanders in the middle of the night. No hx of strokes. No hx of seizures. No tremors. some gait imbalance. Daughter manages her medications. She feeds herself. Daughter helps her with dressing.   She sees a kevin psychiatrist with [facility].   She was born and raised in [place].  Father probably had dementia. Her older brother likely has dementia as well.

## 2024-02-29 NOTE — DATA REVIEWED
[de-identified] : [facility] mild chronic small vessel ischemic changes.  diffuse cerebral atrophy. Ventriculomegaly noted, likely proportionate to degree of atrophy.\par  [facility] Slight increase in periventricular white matter signal abnormality (microvascular ischemic vs transependymal flow) with possibly some slight increase in size of ventricles with more thinning of corpus callosum noted on this scan. Read as "moderate" microvascular ischemic changes with progression from last MRI. \par  \par  \par   [de-identified] : routine EEG 1/2020 normal [de-identified] :  FDG PET scan 1/10/2023- c/w AD

## 2024-04-10 PROCEDURE — 99214 OFFICE O/P EST MOD 30 MIN: CPT

## 2024-05-23 ENCOUNTER — APPOINTMENT (OUTPATIENT)
Dept: NEUROLOGY | Facility: CLINIC | Age: 79
End: 2024-05-23
Payer: MEDICARE

## 2024-05-23 VITALS
BODY MASS INDEX: 22.49 KG/M2 | HEIGHT: 65 IN | HEART RATE: 67 BPM | SYSTOLIC BLOOD PRESSURE: 144 MMHG | WEIGHT: 135 LBS | DIASTOLIC BLOOD PRESSURE: 79 MMHG

## 2024-05-23 DIAGNOSIS — F03.918 UNSPECIFIED DEMENTIA, UNSPECIFIED SEVERITY, WITH OTHER BEHAVIORAL DISTURBANCE: ICD-10-CM

## 2024-05-23 DIAGNOSIS — G30.9 ALZHEIMER'S DISEASE, UNSPECIFIED: ICD-10-CM

## 2024-05-23 DIAGNOSIS — F02.80 ALZHEIMER'S DISEASE, UNSPECIFIED: ICD-10-CM

## 2024-05-23 PROCEDURE — 99214 OFFICE O/P EST MOD 30 MIN: CPT

## 2024-05-23 PROCEDURE — G2211 COMPLEX E/M VISIT ADD ON: CPT

## 2024-05-23 RX ORDER — RISPERIDONE 0.5 MG/1
0.5 TABLET, FILM COATED ORAL
Refills: 0 | Status: ACTIVE | COMMUNITY

## 2024-05-23 NOTE — DATA REVIEWED
[de-identified] : [facility] small vessel ischemic changes.  diffuse cerebral atrophy. Ventriculomegaly noted, likely proportionate to degree of atrophy.\par  [facility] Slight increase in periventricular white matter signal abnormality (microvascular ischemic vs transependymal flow) with possibly some slight increase in size of ventricles with more thinning of corpus callosum noted on this scan. Read as "moderate" microvascular ischemic changes with progression from last MRI. \par  \par  \par   [de-identified] : routine EEG 1/2020 normal [de-identified] :  FDG PET scan 1/10/2023- c/w AD

## 2024-05-23 NOTE — HISTORY OF PRESENT ILLNESS
[FreeTextEntry1] : INTERIM HX 05/23/2024: Here w/ aid, daughter on call. no major concerns.  She is taking risperidone only at night, lethargy better.  No recent hospitalizations. Saw OBGYN and dx/d with bacterial vaginosis.  no seizures. no falls.   INTERIM HX 02/29/2024: Here w/ HHA. Daughter (Ciarra) on the phone. More aggressiveness and some physical aggression. Daughter concerned she might have a UTI. No seizures since last visit.  PCP prescribed risperidone 0.5 mg BID (not started yet)  INTERIM HX 01/18/2024: She was admitted to [facility] earlier this month for COVID and UTI. Daughter reports Behavorial symptoms worsened just before admission, pt became more aggressive, confused and was walking around naked and wanted to leave the house. She had a seizure at home, had generalized convulsions x 2. In hospital, she had 72 hr EEG which showed some seizure activity per daughter and started her on valproic acid 250 mg BID. She was treated for UTI. since discharge from hospital, she is "night and day different".  Pt followed by kevin psych. Started on memantine 5 mg BID (still on titration) last month.  She has 24 hr home health aid.   INTERIM HX 10/30/2023: Pt had a seizure 2 weekends ago, witnessed- choking noises > generalized shaking and eyes rolled up. Lasted ~ 10 sec. Taken to [facility]. Reported to have low BS and low BP. Verapamil and metformin dc'd. rEEG @ hospital neg for seizures per daughter. no AED started, advised to follow up outpatient.  pt had 24 hr vEEG 10/19, 30 min report with mild diffuse slowing, no seizures, final report pending.    INTERIM HX 09/15/2023: FDG PET scan 1/10/2023- c/w AD. Started seeing ADC program 3/2023. Returns for follow up w. concerns for passing out spells. Here with daughter. In the last year, daughter reports patient has had 6 pass out spells, she had been seen at [facility] for these spells and diagnosed with either "vasovagal" or hypoglycemia. Daughter reports that she usually hears a loud noise and then finds her mother on the floor, she is confused/incoherent for some time after episode, has had bowel incontinence on 1-2 occasions. No witnessed seizure like activity.  From cognitive standpoint, pt is stable, able to hold a conversation. She does wander.  PCP started on her donepezil 2 weeks ago. Able to bath and feed herself. SHe has some trouble comprehending/executing instructions. Repeats herself frequently. Followed by kevin psych, Dr Braxton.   HPI (initial visit Dec 14, 2022)- WILSON SWANSON is a 77 year old right handed woman w/ hx of HTN, HLD, DM, Anxiety/depression, osteoporosis referred to neurology for memory loss.   She is accompanied by daughter, Ciarra ().   Memory loss- Cognitive decline most noticeable around the time of the pandemic (2020). Daughter was spending more time with her at home and noticing memory loss. Things were misplaced in the house, there would be toilet paper in the room. + Word finding difficulty, would get frustrated. SHe was more emotional labile, would cry. Got lost while driving a few times. She retired from her job as a . Daughter found multiple receipts and mail from years ago and money laying around in different envelopes. She walked out of the house a few times and would get lost and could not find her way back home. She followed up with PCP who recommended seeing neurologist (Dr. Ulisses Webb). She saw a neurologist at [facility]. She had MRI brain scans, carotid Doppler and EEG. Told she had "normal aging". No seizures on EEG. Daughter found a neuropsychologist and had a formal cognitive evaluation and was eventually diagnosed with vascular dementia.   SHe is no longer driving. She lives with daughter. She goes to dementia center 3x/week in [place]. She rarely cooks. She talks to mirrors. Possibly has some visual hallucinations. SHe wanders in the middle of the night. No hx of strokes. No hx of seizures. No tremors. some gait imbalance. Daughter manages her medications. She feeds herself. Daughter helps her with dressing.   She sees a kevin psychiatrist with [facility].   She was born and raised in [place].  Father probably had dementia. Her older brother likely has dementia as well. dyspnea on exertion/chest pain

## 2024-05-23 NOTE — PHYSICAL EXAM
[FreeTextEntry1] : MOCA 2022: 3/30   Alert, no acute distress, attentive. Oriented name and . Does not know year or current date.  "[place]", does not know state.  Knows address.  Knows I am a "doctor".  No dysarthria. Able to name most objects (pen and watch but not phone). Some comprehension difficulties and trouble following some simple tasks. EOMI, no nystagmus, no ptosis Face is symmetric with normal eye closure and smile. There is drift in UE or LE, moving all extremities symmetrically. b/l cogwheel rigidity at wrist with distraction. No tremors. No bradykinesia. Slightly stooped posture. Slow gait with short steps. Imbalance when making turns.

## 2024-05-23 NOTE — ASSESSMENT
[FreeTextEntry1] : Assessment/Plan:  78 year old female w/ hx of progressive cognitive and behavioral symptoms since approx 2020, affecting memory, executive functioning and language. She has had more emotional lability, hoarding behaviors and possibly some visual hallucinations as well. Furthermore, she has also had a decline in gait with more imbalance. On cognitive screening evaluation (MOCA) 12/2022 she performed very poorly and showed severe global cognitive impairment (3/30, mis 0/15). Neurological exam non focal and without clear parkinsonism.  Moderate-severe dementia with behavioral disturbances- Global cognitive impairment. Clinically I suspected neurodegenerative dementia, AD vs FTD. FDG PET scan c/w AD.  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 No major changes since last visit.    Plan:- [] Daughter to obtain records from [facility] - pending [] Continue valproic acid 250 mg BID for seizure prophylaxis. [] Continue donepezil 10 mg QD (continue to monitor HR). [] Continue memantine 5 mg BID. Can increase to 10 mg BID, as tolerated. [] Discussed compensatory cognitive strategies [] Encouraged physical exercise, healthy eating, cognitive stimulation (readings books, word puzzles), social interaction and adequate hydration and sleep. [] [facility]  [] Counselled on treatment of vascular risk factors, sleep and mood disorders. [] Pt was followed by kevin psych (Dr Coronado), now being managed by PCP.   Disability paperwork completed at visit  Counselled on the diagnosis of AD and reviewed its natural history. As the disease progresses, there is increasing impairment in cognitive functioning, including memory, language, and executive functioning. Behavioral changes also progress as the disease advances, as does one's ability to function independently; eventually requiring full time assistance with daily needs. Counselled on safety and future planning. There is no curative treatment, however there are therapies available that can help temporarily mitigate cognitive symptoms of AD, but do not halt the overall progression of disease. Discussed strategies for maintaining cognitive health (encouraged healthy eating habits, routine physical exercise, social interaction and cognitive stimulations (reading, word puzzles)). Counselled on management of vascular risk factors.   Leticia Merrill M.D.

## 2024-07-17 ENCOUNTER — APPOINTMENT (OUTPATIENT)
Dept: UROLOGY | Facility: CLINIC | Age: 79
End: 2024-07-17

## 2024-07-24 ENCOUNTER — APPOINTMENT (OUTPATIENT)
Dept: UROLOGY | Facility: CLINIC | Age: 79
End: 2024-07-24
Payer: MEDICARE

## 2024-07-24 VITALS
RESPIRATION RATE: 17 BRPM | SYSTOLIC BLOOD PRESSURE: 161 MMHG | WEIGHT: 135 LBS | BODY MASS INDEX: 22.49 KG/M2 | HEIGHT: 65 IN | HEART RATE: 78 BPM | DIASTOLIC BLOOD PRESSURE: 79 MMHG

## 2024-07-24 DIAGNOSIS — R30.0 DYSURIA: ICD-10-CM

## 2024-07-24 DIAGNOSIS — N95.2 POSTMENOPAUSAL ATROPHIC VAGINITIS: ICD-10-CM

## 2024-07-24 DIAGNOSIS — N39.0 URINARY TRACT INFECTION, SITE NOT SPECIFIED: ICD-10-CM

## 2024-07-24 DIAGNOSIS — R32 UNSPECIFIED URINARY INCONTINENCE: ICD-10-CM

## 2024-07-24 LAB
APPEARANCE: CLEAR
BACTERIA: NEGATIVE /HPF
BILIRUBIN URINE: NEGATIVE
BLOOD URINE: NEGATIVE
CAST: 2 /LPF
COLOR: NORMAL
EPITHELIAL CELLS: 2 /HPF
GLUCOSE QUALITATIVE U: NEGATIVE MG/DL
KETONES URINE: ABNORMAL MG/DL
LEUKOCYTE ESTERASE URINE: NEGATIVE
MICROSCOPIC-UA: NORMAL
NITRITE URINE: NEGATIVE
PH URINE: 6
PROTEIN URINE: NEGATIVE MG/DL
RED BLOOD CELLS URINE: 7 /HPF
REVIEW: NORMAL
SPECIFIC GRAVITY URINE: 1.02
UROBILINOGEN URINE: 1 MG/DL
WHITE BLOOD CELLS URINE: 1 /HPF

## 2024-07-24 PROCEDURE — 99213 OFFICE O/P EST LOW 20 MIN: CPT | Mod: 25

## 2024-07-24 PROCEDURE — 51701 INSERT BLADDER CATHETER: CPT

## 2024-07-24 NOTE — REVIEW OF SYSTEMS
[Chills] : chills [Feeling Tired] : feeling tired [Dry Eyes] : dryness of the eyes [Palpitations] : palpitations [Abdominal Pain] : abdominal pain [Constipation] : constipation [Joint Pain] : joint pain [Dizziness] : dizziness [Negative] : Heme/Lymph

## 2024-07-24 NOTE — ADDENDUM
[FreeTextEntry1] : This note was partly authored by Partha Lizama working as a scribe for BRENDA Dalton. I, BRENDA Dalton, have reviewed the content of this note and confirm it is true and accurate. I personally performed the history and physical examination and made all the decisions. 07/24/2024.

## 2024-07-24 NOTE — LETTER BODY
[Dear  ___] : Dear  [unfilled], [Consult Letter:] : I had the pleasure of evaluating your patient, [unfilled]. [Please see my note below.] : Please see my note below. [Consult Closing:] : Thank you very much for allowing me to participate in the care of this patient.  If you have any questions, please do not hesitate to contact me. [FreeTextEntry1] : Please see my note. I will keep you informed. [FreeTextEntry3] : Sincerely,  Katherine Kan MD Clinical  [facility]

## 2024-07-24 NOTE — LETTER BODY
[Dear  ___] : Dear  [unfilled], [Consult Letter:] : I had the pleasure of evaluating your patient, [unfilled]. [Please see my note below.] : Please see my note below. [Consult Closing:] : Thank you very much for allowing me to participate in the care of this patient.  If you have any questions, please do not hesitate to contact me. [FreeTextEntry1] : Please see my note. I will keep you informed. [FreeTextEntry3] : Sincerely,  Katherine Kan MD Clinical  [facility] for Urology [facility]

## 2024-07-24 NOTE — HISTORY OF PRESENT ILLNESS
[FreeTextEntry1] : 07/24/2024: WILSON SWANSON is a 78-year-old F who presents with diabetes and very bad dementia. She is accompanied by formal caregiver, and her daughter (via teleconference).   She was evaluated for UTI earlier this year (apparently was admitted to [place] [place] for UTI and almost passing out).  She had a catheterized specimen on 2/8/24. Culture was negative She was prescribed estradiol cream topically x 1 mo and then 2x'/wk. and thought other UTI avoidance strategies such as increasing water intake (Hydrate to >2.7 L or more daily)  She returns today for a follow up.  As per daughter, Patient was recently treated by GYN with 2 rounds of abx for UTI and BV.  As per daughter, patient is also symptomatic, complaining of burning when voiding.   Does get sensation to void and goes into restroom to void, and apparently goes into restroom 1-2x's/hr. No known h/o stones or Gross hematuria.  Reportedly, no vaginal bleeding and family friend says she might have seen Gyn a yr or so ago.  Occ fecal incontinence, but rare. Has also had blood in stool, but not urine.

## 2024-07-24 NOTE — REASON FOR VISIT
[Formal Caregiver] : formal caregiver [Family Member] : family member [Follow-up Visit ___] : a follow-up visit  for [unfilled]

## 2024-07-24 NOTE — ASSESSMENT
[FreeTextEntry1] : Patient provided Cath'ed specimen. Catheter inserted without difficulty. Clear yellow urine return. 80ml of urine drained. Urine specimen obtained. Catheter removed intact. Patient tolerated well.  UTI avoidance strategies reviewed:  Hydrate to >2.7 L or more daily manage and maintain normal bowels Consider daily probiotic Spoke of what is known about [place] In post menopausal women, should strongly consider topical or intravaginal estrogen therapy, if safe.   Renewal estradiol cream topically x 1 mo and then 2x'/wk  Catheterized Urine will be sent for UA w Microscopic, and Urine culture.   Patient will be called with results.

## 2024-07-24 NOTE — PHYSICAL EXAM
[Normal Appearance] : normal appearance [General Appearance - In No Acute Distress] : no acute distress [] : no respiratory distress [Skin Color & Pigmentation] : normal skin color and pigmentation

## 2024-07-24 NOTE — HISTORY OF PRESENT ILLNESS
[FreeTextEntry1] : 07/24/2024: WILSON SWANSON is a 78-year-old F who presents with diabetes and very bad dementia. She is accompanied by formal caregiver, and her daughter (via teleconference).   She was evaluated for UTI earlier this year (apparently was admitted to [facility] for UTI and almost passing out).  She had a catheterized specimen on 2/8/24. Culture was negative She was prescribed estradiol cream topically x 1 mo and then 2x'/wk. and thought other UTI avoidance strategies such as increasing water intake (Hydrate to >2.7 L or more daily)  She returns today for a follow up.  As per daughter, Patient was recently treated by GYN with 2 rounds of abx for UTI and BV.  As per daughter, patient is also symptomatic, complaining of burning when voiding.   Does get sensation to void and goes into restroom to void, and apparently goes into restroom 1-2x's/hr. No known h/o stones or Gross hematuria.  Reportedly, no vaginal bleeding and family friend says she might have seen Gyn a yr or so ago.  Occ fecal incontinence, but rare. Has also had blood in stool, but not urine.

## 2024-07-26 LAB — BACTERIA UR CULT: NORMAL

## 2024-08-12 ENCOUNTER — APPOINTMENT (OUTPATIENT)
Dept: UROLOGY | Facility: CLINIC | Age: 79
End: 2024-08-12
Payer: MEDICARE

## 2024-08-12 VITALS — OXYGEN SATURATION: 100 % | SYSTOLIC BLOOD PRESSURE: 157 MMHG | DIASTOLIC BLOOD PRESSURE: 83 MMHG | HEART RATE: 79 BPM

## 2024-08-12 DIAGNOSIS — R30.0 DYSURIA: ICD-10-CM

## 2024-08-12 DIAGNOSIS — N39.0 URINARY TRACT INFECTION, SITE NOT SPECIFIED: ICD-10-CM

## 2024-08-12 DIAGNOSIS — R31.29 OTHER MICROSCOPIC HEMATURIA: ICD-10-CM

## 2024-08-12 PROCEDURE — 99212 OFFICE O/P EST SF 10 MIN: CPT | Mod: 25

## 2024-08-12 PROCEDURE — 52000 CYSTOURETHROSCOPY: CPT

## 2024-08-12 NOTE — HISTORY OF PRESENT ILLNESS
[FreeTextEntry1] : 08/12/2024:  WILSON SWANSON is a 78-year-old F who presents with diabetes and very bad dementia. She is accompanied by formal caregiver, and her daughter (via teleconference).  SHe presents today for Cystoscopy for microhematuria (7 rbc/hpf on 7/24/24)  She was evaluated for UTI earlier this year (apparently was admitted to [place] for UTI and almost passing out).  As per daughter, Patient was previously treated by GYN with 2 rounds of abx for UTI and BV.  She had a catheterized specimen on 2/8/24. Culture was negative Culture was also negative on 7/24/24. but was found with microhematuria.   She was prescribed estradiol cream topically x 1 mo and then 2x'/wk. and thought other UTI avoidance strategies such as increasing water intake (Hydrate to >2.7 L or more daily)  As per aide, and daughter patient has been using Estradiol cream topically x2 weekly. They report foul-smelling urine. No other side effects. No evidence of pain or suprapubic pressure etc.

## 2024-08-12 NOTE — PHYSICAL EXAM
[Normal Appearance] : normal appearance [General Appearance - In No Acute Distress] : no acute distress [] : no respiratory distress [Exaggerated Use Of Accessory Muscles For Inspiration] : no accessory muscle use [Abdomen Soft] : soft [Abdomen Tenderness] : non-tender [Urinary Bladder Findings] : the bladder was normal on palpation [Normal Station and Gait] : the gait and station were normal for the patient's age [Skin Color & Pigmentation] : normal skin color and pigmentation [No Focal Deficits] : no focal deficits [Mood] : the mood was normal

## 2024-08-12 NOTE — LETTER BODY
[Dear  ___] : Dear  [unfilled], [Consult Letter:] : I had the pleasure of evaluating your patient, [unfilled]. [Please see my note below.] : Please see my note below. [Consult Closing:] : Thank you very much for allowing me to participate in the care of this patient.  If you have any questions, please do not hesitate to contact me. [FreeTextEntry1] : Please see my note. I will keep you informed. [FreeTextEntry3] : Sincerely,  Katherine Kan MD Clinical  [facility] for Urology [facility]

## 2024-08-12 NOTE — ASSESSMENT
[FreeTextEntry1] : patient with very bad dementia, and h/o recurrent UTI.  Recent microhematuria (7 rbc/hpf on 7/24/24) Culture was negative.   Cystoscopy today:  Consent obtained verbally from daughter with MA, scribe and Aide present as witness.  Cysto was completely normal Normal bladder. No evidence of inflammation. No tumor.  Voided Urine sent for Cytology.  Dark yellow urine return, which is likely the source of foul-smelling urine.  Aide and Patient encouraged to increase patient's water intake to prevent hyper concentration of urine in bladder.   UTI avoidance strategies reviewed. Continue application of estradiol cream topically 2x'/wk.  Follow up PRN.   Culture taken from scope but bladder was pristine.

## 2024-08-12 NOTE — ADDENDUM
[FreeTextEntry1] : This note was partly authored by Partha Lizama working as a scribe for BRENDA Dalton. I, BRENDA Dalton, have reviewed the content of this note and confirm it is true and accurate. I personally performed the history and physical examination and made all the decisions. 08/12/2024.

## 2024-08-15 DIAGNOSIS — N39.0 URINARY TRACT INFECTION, SITE NOT SPECIFIED: ICD-10-CM

## 2024-08-15 DIAGNOSIS — A49.9 URINARY TRACT INFECTION, SITE NOT SPECIFIED: ICD-10-CM

## 2024-08-15 RX ORDER — CEFUROXIME AXETIL 500 MG/1
500 TABLET ORAL
Qty: 14 | Refills: 0 | Status: ACTIVE | COMMUNITY
Start: 2024-08-15 | End: 1900-01-01

## 2024-09-22 ENCOUNTER — NON-APPOINTMENT (OUTPATIENT)
Age: 79
End: 2024-09-22

## 2024-09-23 ENCOUNTER — APPOINTMENT (OUTPATIENT)
Dept: NEUROLOGY | Facility: CLINIC | Age: 79
End: 2024-09-23
Payer: MEDICARE

## 2024-09-23 VITALS — SYSTOLIC BLOOD PRESSURE: 158 MMHG | HEART RATE: 67 BPM | DIASTOLIC BLOOD PRESSURE: 81 MMHG

## 2024-09-23 DIAGNOSIS — F03.918 UNSPECIFIED DEMENTIA, UNSPECIFIED SEVERITY, WITH OTHER BEHAVIORAL DISTURBANCE: ICD-10-CM

## 2024-09-23 DIAGNOSIS — G30.9 ALZHEIMER'S DISEASE, UNSPECIFIED: ICD-10-CM

## 2024-09-23 DIAGNOSIS — G40.909 EPILEPSY, UNSPECIFIED, NOT INTRACTABLE, W/OUT STATUS EPILEPTICUS: ICD-10-CM

## 2024-09-23 DIAGNOSIS — F02.80 ALZHEIMER'S DISEASE, UNSPECIFIED: ICD-10-CM

## 2024-09-23 PROCEDURE — G2211 COMPLEX E/M VISIT ADD ON: CPT

## 2024-09-23 PROCEDURE — 99214 OFFICE O/P EST MOD 30 MIN: CPT

## 2024-09-23 NOTE — ASSESSMENT
[FreeTextEntry1] : Assessment/Plan:  79 year old female w/ hx of progressive cognitive and behavioral symptoms since approx 2020, affecting memory, executive functioning and language. She has had more emotional lability, hoarding behaviors and possibly some visual hallucinations as well. Furthermore, she has also had a decline in gait with more imbalance. On cognitive screening evaluation (MOCA) 12/2022 she performed very poorly and showed severe global cognitive impairment (3/30, mis 0/15). Neurological exam non focal and without clear parkinsonism.  Moderate-severe dementia with behavioral disturbances- Global cognitive impairment. Clinically I suspected neurodegenerative dementia, AD vs FTD. FDG PET scan c/w AD.  Imp: # AD +/- vascular dementia (Mixed dementia)- moderate-severe stage # Seizure disorder since 2023 - controlled on valproic acid   Plan:- [] Continue valproic acid 250 mg BID for seizure prophylaxis. [] Continue donepezil 10 mg QD (continue to monitor HR). [] Continue [id number] mg BID. [] Encouraged physical exercise, healthy eating, cognitive stimulation (readings books, word puzzles), social interaction and adequate hydration and sleep. [] [facility] [] Counselled on treatment of vascular risk factors, sleep and mood disorders. [] Pt was followed by [facility] (Dr Coronado), now being managed by PCP.  RTC 6 months  Counselled on the diagnosis of AD and reviewed its natural history. As the disease progresses, there is increasing impairment in cognitive functioning, including memory, language, and executive functioning. Behavioral changes also progress as the disease advances, as does one's ability to function independently; eventually requiring full time assistance with daily needs. Counselled on safety and future planning. There is no curative treatment, however there are therapies available that can help temporarily mitigate cognitive symptoms of AD, but do not halt the overall progression of disease. Discussed strategies for maintaining cognitive health (encouraged healthy eating habits, routine physical exercise, social interaction and cognitive stimulations (reading, word puzzles)). Counselled on management of vascular risk factors.   Leticia Merrill M.D.

## 2024-09-23 NOTE — DATA REVIEWED
[de-identified] : [facility] mild chronic small vessel ischemic changes.  diffuse cerebral atrophy. Ventriculomegaly noted, likely proportionate to degree of atrophy.\par  [facility] Slight increase in periventricular white matter signal abnormality (microvascular ischemic vs transependymal flow) with possibly some slight increase in size of ventricles with more thinning of corpus callosum noted on this scan. Read as "moderate" microvascular ischemic changes with progression from last MRI. \par  \par  \par   [de-identified] : routine EEG 1/2020 normal [de-identified] :  FDG PET scan 1/10/2023- c/w AD

## 2024-09-23 NOTE — PHYSICAL EXAM
[FreeTextEntry1] : MOCA 12/2022: 3/30   Alert, no acute distress, attentive. Pt a little restless during visit.  Greats examiner and says, "Hi, how are you?". Oriented to self only.  No dysarthria. Able to follow simple commands.  EOMI, no nystagmus, no ptosis Face is symmetric with normal eye closure and smile. There is drift in UE or LE, moving all extremities symmetrically, though favors RUE over LUE b/l cogwheel rigidity at wrist with distraction. No tremors. No bradykinesia. Slightly stooped posture. Shuffling gait.

## 2024-09-23 NOTE — HISTORY OF PRESENT ILLNESS
[FreeTextEntry1] : INTERIM HX 09/23/2024: Here with aid and daughter. No major concerns. She was diagnosed with JW and now on cpap- this helps her daytime energy.  She does get frequent UTI's and gets agitated around this time. No seizures. Risperidone was switched to seroquel by PCP.   INTERIM HX 05/23/2024: Here w/ aid, daughter on call. no major concerns.  She is taking risperidone only at night, lethargy better.  No recent hospitalizations. Saw OBGYN and dx/d with bacterial vaginosis.  no seizures. no falls.   INTERIM HX 02/29/2024: Here w/ [facility]. Daughter (Ciarra) on the phone. More aggressiveness and some physical aggression. Daughter concerned she might have a UTI. No seizures since last visit.  PCP prescribed risperidone 0.5 mg BID (not started yet)  INTERIM HX 01/18/2024: She was admitted to [facility] earlier this month for COVID and UTI. Daughter reports Behavorial symptoms worsened just before admission, pt became more aggressive, confused and was walking around naked and wanted to leave the house. She had a seizure at home, had generalized convulsions x 2. In hospital, she had 72 hr EEG which showed some seizure activity per daughter and started her on valproic acid 250 mg BID. She was treated for UTI. since discharge from hospital, she is "night and day different".  Pt followed by [facility]. Started on memantine 5 mg BID (still on titration) last month.  She has 24 hr home health aid.   INTERIM HX 10/30/2023: Pt had a seizure 2 weekends ago, witnessed- choking noises > generalized shaking and eyes rolled up. Lasted ~ 10 sec. Taken to [facility]. Reported to have low BS and low BP. Verapamil and metformin dc'd. ShaniaG @ hospital neg for seizures per daughter. no AED started, advised to follow up outpatient.  pt had 24 hr vEEG 10/19, 30 min report with mild diffuse slowing, no seizures, final report pending.    INTERIM HX 09/15/2023: FDG PET scan 1/10/2023- c/w AD. Started seeing [facility] program 3/2023. Returns for follow up w. concerns for passing out spells. Here with daughter. In the last year, daughter reports patient has had 6 pass out spells, she had been seen at [facility] for these spells and diagnosed with either "vasovagal" or hypoglycemia. Daughter reports that she usually hears a loud noise and then finds her mother on the floor, she is confused/incoherent for some time after episode, has had bowel incontinence on 1-2 occasions. No witnessed seizure like activity.  From cognitive standpoint, pt is stable, able to hold a conversation. She does wander.  PCP started on her donepezil 2 weeks ago. Able to bath and feed herself. SHe has some trouble comprehending/executing instructions. Repeats herself frequently. Followed by [facility], Dr Braxton.   HPI (initial visit Dec 14, 2022)- WILSON SWANSON is a 77 year old right handed woman w/ hx of HTN, HLD, DM, Anxiety/depression, osteoporosis referred to neurology for memory loss.   She is accompanied by daughter, Ciarra ().   Memory loss- Cognitive decline most noticeable around the time of the pandemic (2020). Daughter was spending more time with her at home and noticing memory loss. Things were misplaced in the house, there would be toilet paper in the room. + Word finding difficulty, would get frustrated. SHe was more emotional labile, would cry. Got lost while driving a few times. She retired from her job as a . Daughter found multiple receipts and mail from years ago and money laying around in different envelopes. She walked out of the house a few times and would get lost and could not find her way back home. She followed up with PCP who recommended seeing neurologist (Dr. Ulisses Webb). She [facility]. She had MRI brain scans, carotid Doppler and EEG. Told she had "normal aging". No seizures on EEG. Daughter found a neuropsychologist and had a formal cognitive evaluation and was eventually diagnosed with vascular dementia.   SHe is no longer driving. She lives with daughter. She goes to dementia center 3x/week in [place]. She rarely cooks. She talks to mirrors. Possibly has some visual hallucinations. SHe wanders in the middle of the night. No hx of strokes. No hx of seizures. No tremors. some gait imbalance. Daughter manages her medications. She feeds herself. Daughter helps her with dressing.   She sees a kevin psychiatrist with [facility].   She was born and raised in [place].  Father probably had dementia. Her older brother likely has dementia as well.

## 2025-01-07 ENCOUNTER — APPOINTMENT (OUTPATIENT)
Dept: NEUROLOGY | Facility: CLINIC | Age: 80
End: 2025-01-07
Payer: MEDICARE

## 2025-01-07 VITALS
SYSTOLIC BLOOD PRESSURE: 126 MMHG | WEIGHT: 172 LBS | HEART RATE: 87 BPM | DIASTOLIC BLOOD PRESSURE: 79 MMHG | BODY MASS INDEX: 28.66 KG/M2 | HEIGHT: 65 IN

## 2025-01-07 DIAGNOSIS — G40.909 EPILEPSY, UNSPECIFIED, NOT INTRACTABLE, W/OUT STATUS EPILEPTICUS: ICD-10-CM

## 2025-01-07 PROCEDURE — 99202 OFFICE O/P NEW SF 15 MIN: CPT

## 2025-01-07 PROCEDURE — G2211 COMPLEX E/M VISIT ADD ON: CPT

## 2025-01-07 RX ORDER — VALPROIC ACID 250 MG/5ML
250 SOLUTION ORAL
Qty: 900 | Refills: 3 | Status: ACTIVE | COMMUNITY
Start: 2025-01-07 | End: 1900-01-01

## 2025-01-08 ENCOUNTER — APPOINTMENT (OUTPATIENT)
Dept: UROLOGY | Facility: CLINIC | Age: 80
End: 2025-01-08

## 2025-01-08 VITALS
HEIGHT: 65 IN | RESPIRATION RATE: 17 BRPM | HEART RATE: 81 BPM | BODY MASS INDEX: 28.66 KG/M2 | OXYGEN SATURATION: 96 % | SYSTOLIC BLOOD PRESSURE: 163 MMHG | DIASTOLIC BLOOD PRESSURE: 92 MMHG | WEIGHT: 172 LBS

## 2025-01-08 PROCEDURE — 99214 OFFICE O/P EST MOD 30 MIN: CPT

## 2025-01-08 RX ORDER — METHENAMINE HIPPURATE 1 G/1
1 TABLET ORAL
Qty: 180 | Refills: 3 | Status: ACTIVE | COMMUNITY
Start: 2025-01-08 | End: 1900-01-01

## 2025-01-21 PROBLEM — R35.1 NOCTURIA: Status: ACTIVE | Noted: 2025-01-21

## 2025-03-17 ENCOUNTER — APPOINTMENT (OUTPATIENT)
Dept: NEUROLOGY | Facility: CLINIC | Age: 80
End: 2025-03-17

## 2025-05-15 DIAGNOSIS — G40.909 EPILEPSY, UNSPECIFIED, NOT INTRACTABLE, W/OUT STATUS EPILEPTICUS: ICD-10-CM
